# Patient Record
Sex: FEMALE | Race: OTHER | URBAN - METROPOLITAN AREA
[De-identification: names, ages, dates, MRNs, and addresses within clinical notes are randomized per-mention and may not be internally consistent; named-entity substitution may affect disease eponyms.]

---

## 2019-06-24 ENCOUNTER — IMPORTED ENCOUNTER (OUTPATIENT)
Dept: URBAN - METROPOLITAN AREA CLINIC 9 | Facility: CLINIC | Age: 79
End: 2019-06-24

## 2020-07-30 ENCOUNTER — IMPORTED ENCOUNTER (OUTPATIENT)
Dept: URBAN - METROPOLITAN AREA CLINIC 9 | Facility: CLINIC | Age: 80
End: 2020-07-30

## 2021-10-16 ASSESSMENT — VISUAL ACUITY
OD_CC: 20/25 - SN
OS_CC: 20/30 - SN
OS_CC: 20/25 SN
OD_CC: 20/30 - SN

## 2021-10-16 ASSESSMENT — TONOMETRY
OS_IOP_MMHG: 14
OD_IOP_MMHG: 11
OD_IOP_MMHG: 13
OS_IOP_MMHG: 11

## 2022-06-06 NOTE — PROCEDURE NOTE: CLINICAL
PROCEDURE NOTE: Epilation #1 Left Lower Lid. Anesthesia: Topical. Prior to treatment, the risks/benefits/alternatives were discussed. The patient wished to proceed with procedure. Aberrant lashes removed from * lid(s) using microforcep. Patient tolerated procedure well. There were no complications. Post-op instructions given. Esteban Heart